# Patient Record
Sex: MALE | URBAN - METROPOLITAN AREA
[De-identification: names, ages, dates, MRNs, and addresses within clinical notes are randomized per-mention and may not be internally consistent; named-entity substitution may affect disease eponyms.]

---

## 2021-10-06 ENCOUNTER — HOSPITAL ENCOUNTER (EMERGENCY)
Facility: CLINIC | Age: 31
Discharge: HOME | End: 2021-10-06
Attending: EMERGENCY MEDICINE | Admitting: EMERGENCY MEDICINE

## 2021-10-06 VITALS
HEART RATE: 82 BPM | WEIGHT: 235 LBS | RESPIRATION RATE: 18 BRPM | DIASTOLIC BLOOD PRESSURE: 83 MMHG | OXYGEN SATURATION: 100 % | TEMPERATURE: 98.7 F | SYSTOLIC BLOOD PRESSURE: 143 MMHG

## 2021-10-06 DIAGNOSIS — T40.2X1A OPIOID OVERDOSE, ACCIDENTAL OR UNINTENTIONAL, INITIAL ENCOUNTER (H): ICD-10-CM

## 2021-10-06 PROCEDURE — 99283 EMERGENCY DEPT VISIT LOW MDM: CPT

## 2021-10-06 ASSESSMENT — ENCOUNTER SYMPTOMS: SHORTNESS OF BREATH: 0

## 2021-10-06 NOTE — ED TRIAGE NOTES
"Pt reports he has been sober for 3 years but tonight \"got a little too comfortable and did a bump of what I thought was coke but was probably fentanyl.\" Pt calm and cooperative. Denies other co ingestion besides a fransico and coke.   "

## 2021-10-06 NOTE — ED NOTES
Pt rounding done. I entered pt's room and pt was not in room. I went to the nearby bathrooms and pt was also not in the bathroom. MD made aware. Charge nurse made aware.

## 2021-10-06 NOTE — ED PROVIDER NOTES
History   Chief Complaint:  Ingestion       The history is provided by the patient and the EMS personnel.      Macario Rodriguez is a 31 year old male with history of substance abuse who presents via EMS for evaluation of ingestion. Macario had his first drink in almost 4 years tonight and snorted what he thought was cocaine but now he believes it was fentanyl. He became unresponsive, EMS gave narcan and he is now awake, alert, and oriented. No shortness of breath. He feels normal in the ER. He has not needed narcan before.     Review of Systems   Respiratory: Negative for shortness of breath.    All other systems reviewed and are negative.    Allergies:  No Known Allergies    Medications:  The patient is currently on no regular medications.    Past Medical History:     The patient denies any significant past medical history.    Social History:  Presents unaccompanied via EMS  Alcohol use: positive   Drug use: positive for fentanyl     Physical Exam     Patient Vitals for the past 24 hrs:   BP Temp Temp src Pulse Resp SpO2 Weight   10/06/21 0555 (!) 143/83 -- -- 82 -- 100 % --   10/06/21 0540 (!) 145/87 -- -- 83 -- 100 % --   10/06/21 0530 -- -- -- -- -- 100 % --   10/06/21 0521 (!) 142/92 98.7  F (37.1  C) Temporal 97 18 99 % 106.6 kg (235 lb)       Physical Exam   General: Patient is awake, alert and interactive when I enter the room  Head: The scalp, face, and head appear normal  Eyes: The pupils are equal, round, and reactive to light. Conjunctivae and sclerae are normal  ENT: External acoustic canals are normal. The oropharynx is normal without erythema. Uvula is in the midline  Neck: Normal range of motion.   CV: Regular rate and rhythm.   Resp: Lungs are clear without wheezes or rales. No respiratory distress.   GI: Abdomen is soft, no rigidity, guarding, or rebound. No distension. No tenderness to palpation in any quadrant.     MS: Normal tone. Joints grossly normal without effusions. No asymmetric leg swelling,  calf or thigh tenderness.    Skin: No rash or lesions noted. Normal capillary refill noted  Neuro: Speech is normal and fluent. Face is symmetric. Moving all extremities.   Psych:  Normal affect.  Appropriate interactions.      Emergency Department Course     Emergency Department Course:  Reviewed:  I reviewed nursing notes, vitals, past medical history and Care Everywhere    Assessments:  0531 I obtained history and examined the patient as noted above.     Disposition:  Patient eloped.     Impression & Plan     Medical Decision Making:  Patient presents emergency department after accidental overdose on opioids.  Patient received Narcan with good effect.  Patient is awake and alert and conversant.  Physical exam is reassuring.  He is oxygenating well on room air.  Discussed observing him in the emergency department for few hours.  Unfortunately patient eloped from the emergency department.      Diagnosis:    ICD-10-CM    1. Opioid overdose, accidental or unintentional, initial encounter (H)  T40.2X1A        Scribe Disclosure:  Jannette FIGUEROA, am serving as a scribe at 5:28 AM on 10/6/2021 to document services personally performed by Vahid Tripp based on my observations and the provider's statements to me.            Vahid Tripp MD  10/06/21 3991